# Patient Record
Sex: MALE | Race: BLACK OR AFRICAN AMERICAN
[De-identification: names, ages, dates, MRNs, and addresses within clinical notes are randomized per-mention and may not be internally consistent; named-entity substitution may affect disease eponyms.]

---

## 2017-02-21 ENCOUNTER — HOSPITAL ENCOUNTER (OUTPATIENT)
Dept: HOSPITAL 74 - FASU | Age: 62
Discharge: HOME | End: 2017-02-21
Attending: OPHTHALMOLOGY
Payer: COMMERCIAL

## 2017-02-21 VITALS — BODY MASS INDEX: 27.9 KG/M2

## 2017-02-21 VITALS — SYSTOLIC BLOOD PRESSURE: 136 MMHG | DIASTOLIC BLOOD PRESSURE: 72 MMHG | HEART RATE: 58 BPM

## 2017-02-21 VITALS — TEMPERATURE: 97.8 F

## 2017-02-21 DIAGNOSIS — H02.423: Primary | ICD-10-CM

## 2017-02-21 PROCEDURE — 08SN0ZZ REPOSITION RIGHT UPPER EYELID, OPEN APPROACH: ICD-10-PCS | Performed by: OPHTHALMOLOGY

## 2017-02-21 PROCEDURE — 08SP0ZZ REPOSITION LEFT UPPER EYELID, OPEN APPROACH: ICD-10-PCS | Performed by: OPHTHALMOLOGY

## 2017-02-21 NOTE — OP
DATE OF OPERATION:  02/21/2017

 

PREOPERATIVE DIAGNOSIS:  Ptosis with visual obstruction and dermatochalasis, both

upper lids.

 

POSTOPERATIVE DIAGNOSIS:  Ptosis with visual obstruction and dermatochalasis, both

upper lids. 

 

PROCEDURE:  

1.  Levator advancement, reattachment, right upper lid. 

2.  Levator advancement, reattachment, left upper lid. 

3.  Blepharoplasty, bilateral upper lids.  

 

SURGEON:  Kapil Hianes MD

 

ANESTHESIA:  Local with sedation.

 

COMPLICATONS:  None.

 

ESTIMATED BLOOD LOSS:  3 mL.   

 

OPERATIVE REPORT:  Patient was brought to the operating room, placed on the operating

room table.  Vital signs monitored by Anesthesia.  Tetracaine was placed in both

eyes.  Lid creases marked symmetrically approximately 9 mm essentially above the

pupil and tapering nasally and temporally and to be symmetric as checked with a

caliper, and then leaving approximately 11 to 12 mm of eyelid skin between the

inferior brow, follicles, and the superior edge of the proposed elliptical excision. 

This was marked with the caliper.  The ellipsis was drawn and then checked with a

smooth forceps to ensure that there would be only minimal lash eversion with this

kind of incorporation of skin.  A time-out was performed, and intravenous sedation

was administered, and 2% Xylocaine with 1:100,000 epinephrine was injected

subcutaneously in the crease of the lower lid and then across the eyelid in the area

of the proposed blepharoplasty for a total of 1.5 mL in both eyelids.  Massage was

applied for hemostasis.  Patient was prepped and draped in the usual sterile fashion

exposing both eyes.  

 

The following procedure was performed bilaterally.  Lid crease was incised down to

skin, subcutaneous tissue, exposing the septum.  A suborbicularis plane was dissected

superiorly to expose fat bulge.  The septum was opened centrally exposing the fat,

which was then elevated and  from the glistening levator aponeurosis muscle.

 In both eyelids, the levator aponeurosis was seen to be dehisced from the

anterior-superior tarsus with a rarification of the distal aponeurosis.  The

suborbicularis plane was then dissected inferiorly exposing the anterior-superior

tarsus, and each levator aponeurosis was now reattached to the tarsus with a central

and temporal partial-thickness scleral bite of 6-0 Vicryl suture in double-armed

fashion.  These were tied.  The patient was allowed to wake up and placed in the

upright position.  Once he was fully awake, the sutures were adjusted temporally on

the right upper lid until good lid symmetry contour and lid height were obtained. 

The lids were approximately 2 to 3 mm above the lid margin consistent with his

younger pictures.  

 

Patient was now placed in the supine position and more sedation was administered. 

The previously marked blepharoplasty incisions were now excised with a 15 blade and

then with a [place] needle, and nasally, orbicularis incision was made, and then

gentle spreading with Shultz scissors to expose the nasal fat pockets, which were

then sculpted to remove the bulk of the nasal eyelids and feathered into the central

fat pocket.  Hemostasis was achieved throughout the case, and antibiotic irrigation

was used throughout the case.  The eyelids were then closed with a running 6-0 nylon

suture from nasal to temporal, closing skin to skin.  The patient was checked one

more time to ensure good symmetry of the skin.  A small amount of skin was excised

from the left upper lid after this check, as the more conservative ellipse had been

drawn initially, and then, the actual marked line was now excised with the second

excision, and after the skin was closed with running 6-0 nylon sutures, Bacitracin

ointment was placed on the sutures, lids were everted demonstrating no penetration of

the Vicryl sutures, and the patient was taken to the recovery room in stable

condition.  

 

 

KAPIL HAINES M.D.

 

LYNDSEY/[id number]

DD: 02/21/2017 11:53

DT: 02/21/2017 13:14

Job #:  [id number]

## 2017-02-23 NOTE — PATH
Surgical Pathology Report



Patient Name:  SHAUNA HURTADO

Accession #:  

Med. Rec. #:  [id number]                                                        

   /Age/Gender:  1955 (Age: 62) / M

Account:  [id number]                                                          

             Location: [facility] 

Taken:  2017

Received:  2017

Reported:  2017

Physicians:  Mich Reilly

  



Specimen(s) Received

A: RIGTH UPPER EYELID 

B: LEFT UPPER EYELID 





Clinical History

Ptosis both upper eyelids







Final Diagnosis

A. SKIN, RIGHT UPPER EYELID, BLEPHAROPLASTY:  

BENIGN SKIN.



B. SKIN, LEFT UPPER EYELID, BLEPHAROPLASTY:

BENIGN SKIN.





***Electronically Signed***

Alexander Finkelstein, M.D.





Gross Description

A. Received in formalin, labeled "right upper eyelid" is a 5.0 x 0.9 cm

tan-brown, elliptical, unoriented fragment of skin shave. The epidermal surface

is unremarkable. The base is inked blue and representative sections are

submitted in one cassette.



B. Received in formalin, labeled "left upper eyelid" is a 4.3 x 1.0 cm

tan-brown, elliptical, unoriented fragment of skin. The epidermal surface is

unremarkable. The base is inked blue and representative sections are submitted

in one cassette.

## 2018-11-19 PROBLEM — Z00.00 ENCOUNTER FOR PREVENTIVE HEALTH EXAMINATION: Status: ACTIVE | Noted: 2018-11-19

## 2018-11-26 ENCOUNTER — APPOINTMENT (OUTPATIENT)
Dept: HEMATOLOGY ONCOLOGY | Facility: CLINIC | Age: 63
End: 2018-11-26
Payer: MEDICARE

## 2018-11-26 ENCOUNTER — RESULT REVIEW (OUTPATIENT)
Age: 63
End: 2018-11-26

## 2018-11-26 DIAGNOSIS — M75.101 UNSPECIFIED ROTATOR CUFF TEAR OR RUPTURE OF RIGHT SHOULDER, NOT SPECIFIED AS TRAUMATIC: ICD-10-CM

## 2018-11-26 DIAGNOSIS — Z83.3 FAMILY HISTORY OF DIABETES MELLITUS: ICD-10-CM

## 2018-11-26 DIAGNOSIS — Z87.438 PERSONAL HISTORY OF OTHER DISEASES OF MALE GENITAL ORGANS: ICD-10-CM

## 2018-11-26 DIAGNOSIS — K21.9 GASTRO-ESOPHAGEAL REFLUX DISEASE W/OUT ESOPHAGITIS: ICD-10-CM

## 2018-11-26 DIAGNOSIS — Z86.010 PERSONAL HISTORY OF COLONIC POLYPS: ICD-10-CM

## 2018-11-26 PROCEDURE — 99205 OFFICE O/P NEW HI 60 MIN: CPT

## 2018-11-26 RX ORDER — FEXOFENADINE HYDROCHLORIDE 180 MG/1
180 TABLET, FILM COATED ORAL
Refills: 0 | Status: ACTIVE | COMMUNITY

## 2018-11-26 RX ORDER — MULTIVITAMIN
TABLET ORAL
Refills: 0 | Status: ACTIVE | COMMUNITY

## 2018-11-26 NOTE — PHYSICAL EXAM
[Fully active, able to carry on all pre-disease performance without restriction] : Status 0 - Fully active, able to carry on all pre-disease performance without restriction [Normal] : full range of motion and no deformities appreciated

## 2018-11-27 PROBLEM — K21.9 CHRONIC GERD: Status: RESOLVED | Noted: 2018-11-26 | Resolved: 2018-11-27

## 2018-11-27 NOTE — CONSULT LETTER
[Dear  ___] : Dear  [unfilled], [Consult Letter:] : I had the pleasure of evaluating your patient, [unfilled]. [Please see my note below.] : Please see my note below. [Consult Closing:] : Thank you very much for allowing me to participate in the care of this patient.  If you have any questions, please do not hesitate to contact me. [Sincerely,] : Sincerely, [FreeTextEntry3] : Santiago Washington MD, MPH\par Attending Physician\par Hematology Oncology\par [facility] [place]\par [facility]\par

## 2018-11-27 NOTE — ASSESSMENT
[FreeTextEntry1] : Leucopenia\par Neutropenia, lymphopenia\par \par Discussed about the differential including viral syndrome vs nutritional (B12, copper def) vs ?primary bone marrow event vs autoimmune event\par Send blood work as outlined below\par Ultrasound abdomen to look for hepatosplenomegaly\par \par Follow up in 2 weeks to review findings. No repeat labs at follow up

## 2018-11-27 NOTE — HISTORY OF PRESENT ILLNESS
[de-identified] : Mr Pabon is a very pleasant 63 year old gentleman with PMHx of BPH seen today to evaluated  for leucopenia\par \par He follows with Dr Kellogg for almost 10 years and has physical and blood work at least annually\par Was never told of low WBC\par \par Has seen Dr Reynolds (Hematology, [place]) long time back - unsure why.\par Was monitored for a while and they he stopped following up\par \par No fevers chills night sweats\par No fatigue, tiredness, apathy\par No appetite loss or weight loss/weight gain\par No chest pain, shortness of breath, palpitation, dizziness\par No abdominal pain, nausea, vomiting, diarrhea, constipation\par No bright red blood per rectum, hematemesis or melena\par No hematuria, dysuria, frequency, urgency\par No headaches, visual changes, focal weakness, tingling, numbness\par \par No other OTC or herbal medications\par \par He took intra-articular steroids for right rotator cuff tear at [place] ([place])

## 2018-12-04 ENCOUNTER — RESULT REVIEW (OUTPATIENT)
Age: 63
End: 2018-12-04

## 2018-12-14 ENCOUNTER — APPOINTMENT (OUTPATIENT)
Dept: HEMATOLOGY ONCOLOGY | Facility: CLINIC | Age: 63
End: 2018-12-14
Payer: MEDICARE

## 2018-12-14 VITALS
SYSTOLIC BLOOD PRESSURE: 128 MMHG | DIASTOLIC BLOOD PRESSURE: 75 MMHG | HEART RATE: 52 BPM | TEMPERATURE: 98.2 F | WEIGHT: 238 LBS | OXYGEN SATURATION: 98 % | RESPIRATION RATE: 20 BRPM

## 2018-12-14 PROCEDURE — 99214 OFFICE O/P EST MOD 30 MIN: CPT

## 2018-12-14 NOTE — HISTORY OF PRESENT ILLNESS
[de-identified] : Mr Pabon is a very pleasant 63 year old gentleman with PMHx of BPH seen today to evaluated  for leucopenia\par \par He follows with Dr Kellogg for almost 10 years and has physical and blood work at least annually\par Was never told of low WBC\par \par Has seen Dr Reynolds (Hematology, [place]) long time back - unsure why.\par Was monitored for a while and they he stopped following up\par \par No fevers chills night sweats\par No fatigue, tiredness, apathy\par No appetite loss or weight loss/weight gain\par No chest pain, shortness of breath, palpitation, dizziness\par No abdominal pain, nausea, vomiting, diarrhea, constipation\par No bright red blood per rectum, hematemesis or melena\par No hematuria, dysuria, frequency, urgency\par No headaches, visual changes, focal weakness, tingling, numbness\par \par No other OTC or herbal medications\par \par He took intra-articular steroids for right rotator cuff tear at [place] ([place]) [de-identified] : HE is seen today for follow up\par \par No new complaints\par \par

## 2018-12-14 NOTE — ASSESSMENT
[FreeTextEntry1] : Leucopenia\par Neutropenia, lymphopenia\par \par Repeat labs show slightly improved WBC, but has elevated LDH\par Iron profile suggestive of iron overload\par Ultrasound abdomen rules out hepatosplenomegaly\par \par Plan to repeat labs including LDH, hemochromatosis gene study in 3 weeks and follow up in 4 weeks\par No labs on the day of next appointment\par

## 2018-12-14 NOTE — CONSULT LETTER
[Dear  ___] : Dear  [unfilled], [Please see my note below.] : Please see my note below. [Consult Closing:] : Thank you very much for allowing me to participate in the care of this patient.  If you have any questions, please do not hesitate to contact me. [Sincerely,] : Sincerely, [Courtesy Letter:] : I had the pleasure of seeing your patient, [unfilled], in my office today. [FreeTextEntry3] : Santiago Washington MD, MPH\par Attending Physician\par Hematology Oncology\par [facility] [place]\par [facility]\par

## 2018-12-14 NOTE — RESULTS/DATA
[FreeTextEntry1] : WBC 3.3 -> 4.4\par LDH 1267\par ferritin 351.9\par % sat 48\par \par Ultrasound\par Normal caliber spleen and grossly unremarkable appearance of the liver.\par

## 2019-01-04 ENCOUNTER — APPOINTMENT (OUTPATIENT)
Dept: HEMATOLOGY ONCOLOGY | Facility: CLINIC | Age: 64
End: 2019-01-04
Payer: MEDICARE

## 2019-01-04 ENCOUNTER — RESULT REVIEW (OUTPATIENT)
Age: 64
End: 2019-01-04

## 2019-01-04 VITALS
TEMPERATURE: 98 F | RESPIRATION RATE: 16 BRPM | DIASTOLIC BLOOD PRESSURE: 67 MMHG | HEART RATE: 57 BPM | WEIGHT: 235 LBS | SYSTOLIC BLOOD PRESSURE: 109 MMHG | OXYGEN SATURATION: 95 %

## 2019-01-04 PROCEDURE — 99499A: CUSTOM | Mod: NC

## 2019-01-11 ENCOUNTER — APPOINTMENT (OUTPATIENT)
Dept: HEMATOLOGY ONCOLOGY | Facility: CLINIC | Age: 64
End: 2019-01-11
Payer: MEDICARE

## 2019-01-11 VITALS
SYSTOLIC BLOOD PRESSURE: 132 MMHG | RESPIRATION RATE: 18 BRPM | HEART RATE: 59 BPM | WEIGHT: 241 LBS | HEIGHT: 76.38 IN | TEMPERATURE: 98.6 F | DIASTOLIC BLOOD PRESSURE: 74 MMHG | BODY MASS INDEX: 29.05 KG/M2 | OXYGEN SATURATION: 97 %

## 2019-01-11 DIAGNOSIS — R74.0 NONSPECIFIC ELEVATION OF LEVELS OF TRANSAMINASE AND LACTIC ACID DEHYDROGENASE [LDH]: ICD-10-CM

## 2019-01-11 PROCEDURE — 99214 OFFICE O/P EST MOD 30 MIN: CPT

## 2019-01-11 NOTE — RESULTS/DATA
[FreeTextEntry1] : WBC 3.3 -> 4.4 ->3.5\par LDH 1267 -> 400s\par ferritin 351.9\par % sat 48\par \par Ultrasound\par Normal caliber spleen and grossly unremarkable appearance of the liver.\par

## 2019-01-11 NOTE — HISTORY OF PRESENT ILLNESS
[de-identified] : Mr Pabon is a very pleasant 63 year old gentleman with PMHx of BPH seen today to evaluated  for leucopenia\par \par He follows with Dr Kellogg for almost 10 years and has physical and blood work at least annually\par Was never told of low WBC\par \par Has seen Dr Reynolds (Hematology, [place]) long time back - unsure why.\par Was monitored for a while and they he stopped following up\par \par No fevers chills night sweats\par No fatigue, tiredness, apathy\par No appetite loss or weight loss/weight gain\par No chest pain, shortness of breath, palpitation, dizziness\par No abdominal pain, nausea, vomiting, diarrhea, constipation\par No bright red blood per rectum, hematemesis or melena\par No hematuria, dysuria, frequency, urgency\par No headaches, visual changes, focal weakness, tingling, numbness\par \par No other OTC or herbal medications\par \par He took intra-articular steroids for right rotator cuff tear at [place] ([place]) [de-identified] : HE is seen today for follow up\par \par No new complaints\par \par

## 2019-01-11 NOTE — CONSULT LETTER
[Dear  ___] : Dear  [unfilled], [Courtesy Letter:] : I had the pleasure of seeing your patient, [unfilled], in my office today. [Please see my note below.] : Please see my note below. [Consult Closing:] : Thank you very much for allowing me to participate in the care of this patient.  If you have any questions, please do not hesitate to contact me. [Sincerely,] : Sincerely, [FreeTextEntry3] : Santiago Washington MD, MPH\par Attending Physician\par Hematology Oncology\par [facility] [place]\par [facility]\par

## 2019-01-11 NOTE — ASSESSMENT
[FreeTextEntry1] : Leucopenia\par Neutropenia, lymphopenia\par \par Clinically he feels good\par Fluctuating WBC. LDH was elevated in the initial labs, normalized on repeat\par Iron profile suggestive of iron overload - hemochromatosis gene study negative\par Ultrasound abdomen rules out hepatosplenomegaly\par At this time, recommend observation\par \par Plan to repeat labs CBC, CMP, LDH, AN A in 2.5 months and follow up in 3 months\par No labs on the day of next appointment\par

## 2019-04-18 ENCOUNTER — RESULT REVIEW (OUTPATIENT)
Age: 64
End: 2019-04-18

## 2019-04-18 ENCOUNTER — APPOINTMENT (OUTPATIENT)
Dept: HEMATOLOGY ONCOLOGY | Facility: CLINIC | Age: 64
End: 2019-04-18
Payer: MEDICARE

## 2019-04-18 VITALS
OXYGEN SATURATION: 95 % | HEIGHT: 76.38 IN | DIASTOLIC BLOOD PRESSURE: 73 MMHG | RESPIRATION RATE: 16 BRPM | SYSTOLIC BLOOD PRESSURE: 126 MMHG | TEMPERATURE: 98.2 F | BODY MASS INDEX: 28.8 KG/M2 | HEART RATE: 61 BPM | WEIGHT: 239 LBS

## 2019-04-18 PROCEDURE — 99499A: CUSTOM | Mod: NC

## 2019-04-26 ENCOUNTER — APPOINTMENT (OUTPATIENT)
Dept: HEMATOLOGY ONCOLOGY | Facility: CLINIC | Age: 64
End: 2019-04-26
Payer: MEDICARE

## 2019-04-26 VITALS
BODY MASS INDEX: 28.8 KG/M2 | DIASTOLIC BLOOD PRESSURE: 75 MMHG | RESPIRATION RATE: 20 BRPM | HEART RATE: 56 BPM | OXYGEN SATURATION: 96 % | WEIGHT: 239 LBS | SYSTOLIC BLOOD PRESSURE: 122 MMHG | TEMPERATURE: 99 F | HEIGHT: 76.38 IN

## 2019-04-26 PROCEDURE — 99214 OFFICE O/P EST MOD 30 MIN: CPT

## 2019-05-08 ENCOUNTER — APPOINTMENT (OUTPATIENT)
Dept: UROLOGY | Facility: CLINIC | Age: 64
End: 2019-05-08
Payer: MEDICARE

## 2019-05-08 VITALS
SYSTOLIC BLOOD PRESSURE: 105 MMHG | BODY MASS INDEX: 28.44 KG/M2 | WEIGHT: 236 LBS | DIASTOLIC BLOOD PRESSURE: 57 MMHG | TEMPERATURE: 63 F | HEIGHT: 76.38 IN

## 2019-05-08 PROCEDURE — 99213 OFFICE O/P EST LOW 20 MIN: CPT

## 2019-05-08 NOTE — PHYSICAL EXAM
[Urinary Bladder Findings] : the bladder was normal on palpation [Urethral Meatus] : meatus normal [Scrotum] : the scrotum was normal [Testes Mass (___cm)] : there were no testicular masses [No Prostate Nodules] : no prostate nodules

## 2019-05-30 ENCOUNTER — APPOINTMENT (OUTPATIENT)
Dept: RHEUMATOLOGY | Facility: CLINIC | Age: 64
End: 2019-05-30
Payer: MEDICARE

## 2019-05-30 VITALS
BODY MASS INDEX: 29.1 KG/M2 | OXYGEN SATURATION: 98 % | HEIGHT: 76 IN | DIASTOLIC BLOOD PRESSURE: 76 MMHG | HEART RATE: 88 BPM | WEIGHT: 239 LBS | SYSTOLIC BLOOD PRESSURE: 108 MMHG

## 2019-05-30 PROCEDURE — 99205 OFFICE O/P NEW HI 60 MIN: CPT

## 2019-05-31 LAB
CRP SERPL-MCNC: 0.34 MG/DL
ENA RNP AB SER IA-ACNC: 1 AL
ENA SCL70 IGG SER IA-ACNC: <0.2 AL
ENA SM AB SER IA-ACNC: <0.2 AL
ENA SS-A AB SER IA-ACNC: <0.2 AL
ENA SS-B AB SER IA-ACNC: <0.2 AL
ERYTHROCYTE [SEDIMENTATION RATE] IN BLOOD BY WESTERGREN METHOD: 19 MM/HR
RHEUMATOID FACT SER QL: <10 IU/ML

## 2019-05-31 NOTE — REVIEW OF SYSTEMS
[Fever] : no fever [Chills] : no chills [Eye Pain] : no eye pain [Red Eyes] : eyes not red [Shortness Of Breath] : no shortness of breath [Cough] : no cough [Abdominal Pain] : no abdominal pain [Diarrhea] : no diarrhea [Dysuria] : no dysuria [Joint Pain] : no joint pain [Joint Swelling] : no joint swelling [Joint Stiffness] : no joint stiffness [Skin Lesions] : no skin lesions [Convulsions] : no convulsions [FreeTextEntry6] : No pleuritic C/P

## 2019-05-31 NOTE — HISTORY OF PRESENT ILLNESS
[FreeTextEntry1] : Patient was found to have leukopenia during routine PE and was sent to hematology, and when it was noted that work up revealed JÚNIOR positivity, was sent here for evaluation. There have been no fever, rash or skin photosensitivity, Raynaud's, muscle weakness, joint symptoms or symptoms of serositis. 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

## 2019-05-31 NOTE — PHYSICAL EXAM
[General Appearance - Alert] : alert [General Appearance - In No Acute Distress] : in no acute distress [General Appearance - Well Nourished] : well nourished [General Appearance - Well Developed] : well developed [Sclera] : the sclera and conjunctiva were normal [Auscultation Breath Sounds / Voice Sounds] : lungs were clear to auscultation bilaterally [Cervical Lymph Nodes Enlarged Posterior Bilaterally] : posterior cervical [Cervical Lymph Nodes Enlarged Anterior Bilaterally] : anterior cervical [] : no rash [Motor Exam] : the motor exam was normal [FreeTextEntry1] : No evidence of active synovitis throughout all joints examined

## 2019-06-01 LAB — DSDNA AB SER-ACNC: <12 IU/ML

## 2019-06-04 LAB
CCP AB SER IA-ACNC: <8 UNITS
RF+CCP IGG SER-IMP: NEGATIVE

## 2019-07-23 ENCOUNTER — RX RENEWAL (OUTPATIENT)
Age: 64
End: 2019-07-23

## 2019-07-24 ENCOUNTER — RX CHANGE (OUTPATIENT)
Age: 64
End: 2019-07-24

## 2019-08-06 ENCOUNTER — APPOINTMENT (OUTPATIENT)
Dept: UROLOGY | Facility: CLINIC | Age: 64
End: 2019-08-06
Payer: MEDICARE

## 2019-08-06 VITALS
DIASTOLIC BLOOD PRESSURE: 78 MMHG | WEIGHT: 236 LBS | HEART RATE: 60 BPM | HEIGHT: 76 IN | BODY MASS INDEX: 28.74 KG/M2 | SYSTOLIC BLOOD PRESSURE: 131 MMHG

## 2019-08-06 PROCEDURE — 51798 US URINE CAPACITY MEASURE: CPT

## 2019-08-06 PROCEDURE — 99213 OFFICE O/P EST LOW 20 MIN: CPT | Mod: 25

## 2019-08-06 NOTE — PHYSICAL EXAM
[General Appearance - Well Developed] : well developed [General Appearance - Well Nourished] : well nourished [Normal Appearance] : normal appearance [Well Groomed] : well groomed [General Appearance - In No Acute Distress] : no acute distress [Abdomen Soft] : soft [Costovertebral Angle Tenderness] : no ~M costovertebral angle tenderness [Abdomen Tenderness] : non-tender [Urethral Meatus] : meatus normal [Urinary Bladder Findings] : the bladder was normal on palpation [Testes Mass (___cm)] : there were no testicular masses [Scrotum] : the scrotum was normal [No Prostate Nodules] : no prostate nodules [Edema] : no peripheral edema [] : no respiratory distress [Respiration, Rhythm And Depth] : normal respiratory rhythm and effort [Oriented To Time, Place, And Person] : oriented to person, place, and time [Exaggerated Use Of Accessory Muscles For Inspiration] : no accessory muscle use [Affect] : the affect was normal [Mood] : the mood was normal [Not Anxious] : not anxious [Normal Station and Gait] : the gait and station were normal for the patient's age [No Focal Deficits] : no focal deficits [No Palpable Adenopathy] : no palpable adenopathy [FreeTextEntry1] : The prostate is smooth symmetrical and non-concerning on digital rectal examination

## 2019-08-06 NOTE — ASSESSMENT
[FreeTextEntry1] : This is a followup visit for this 64 showed patient who has BPH and was on Cialis 5 mg and wishes he could do better\par He is up and 0-2 times a night and has a slow urination but an ultrasound done today shows that he empties his bladder\par He was unable to tolerate alpha blocker and a discussed with him the option of a Rezum procedure\par He prefers to stay on Cialis 5 mg\par At his request assessment a prescription to [facility] for Cialis 20 mg for his erectile dysfunction
Abdomen soft, non-tender, no guarding.

## 2019-08-06 NOTE — ADDENDUM
[FreeTextEntry1] : PVR\par A transabdominal ultrasound was performed collecting of the bladder and transverse and longitudinal at the postvoid residual

## 2019-09-27 NOTE — RESULTS/DATA
[FreeTextEntry1] : WBC 3.3 -> 4.4 ->3.5 -> 3.8\par LDH 1267 -> 400s-> normal\par ferritin 351.9\par % sat 48\par \par Ultrasound\par Normal caliber spleen and grossly unremarkable appearance of the liver.\par

## 2019-09-27 NOTE — ASSESSMENT
[FreeTextEntry1] : Leucopenia\par Neutropenia, lymphopenia\par \par Clinically he feels good\par Fluctuating WBC. LDH was elevated in the initial labs, normalized on repeat\par Iron profile suggestive of iron overload - hemochromatosis gene study negative\par Ultrasound abdomen rules out hepatosplenomegaly\par At this time, recommend observation\par \par Positive JÚNIOR- low titers, Refer to rheumatology\par \par Plan to repeat labs CBC, CMP, LDH, JÚNIOR and follow up in 6 months\par No labs on the day of next appointment (if he had labs prior to the appointment)\par

## 2019-09-27 NOTE — CONSULT LETTER
[Dear  ___] : Dear  [unfilled], [Courtesy Letter:] : I had the pleasure of seeing your patient, [unfilled], in my office today. [Please see my note below.] : Please see my note below. [Consult Closing:] : Thank you very much for allowing me to participate in the care of this patient.  If you have any questions, please do not hesitate to contact me. [Sincerely,] : Sincerely, [FreeTextEntry3] : Santiago Washington MD, MPH\par Attending Physician\par Hematology Oncology\par [facility] [place]\par [facility]\par

## 2019-09-27 NOTE — HISTORY OF PRESENT ILLNESS
[de-identified] : Mr Pabon is a very pleasant 63 year old gentleman with PMHx of BPH seen today to evaluated  for leucopenia\par \par He follows with Dr Kellogg for almost 10 years and has physical and blood work at least annually\par Was never told of low WBC\par \par Has seen Dr Reynolds (Hematology, [place]) long time back - unsure why.\par Was monitored for a while and they he stopped following up\par \par No fevers chills night sweats\par No fatigue, tiredness, apathy\par No appetite loss or weight loss/weight gain\par No chest pain, shortness of breath, palpitation, dizziness\par No abdominal pain, nausea, vomiting, diarrhea, constipation\par No bright red blood per rectum, hematemesis or melena\par No hematuria, dysuria, frequency, urgency\par No headaches, visual changes, focal weakness, tingling, numbness\par \par No other OTC or herbal medications\par \par He took intra-articular steroids for right rotator cuff tear at [place] ([place]) [de-identified] : HE is seen today for follow up\par \par No new complaints\par \par

## 2019-10-04 ENCOUNTER — RESULT REVIEW (OUTPATIENT)
Age: 64
End: 2019-10-04

## 2019-10-04 ENCOUNTER — APPOINTMENT (OUTPATIENT)
Dept: HEMATOLOGY ONCOLOGY | Facility: CLINIC | Age: 64
End: 2019-10-04
Payer: MEDICARE

## 2019-10-04 VITALS
HEIGHT: 75.98 IN | SYSTOLIC BLOOD PRESSURE: 106 MMHG | OXYGEN SATURATION: 96 % | BODY MASS INDEX: 29.22 KG/M2 | RESPIRATION RATE: 16 BRPM | WEIGHT: 240 LBS | DIASTOLIC BLOOD PRESSURE: 62 MMHG | HEART RATE: 49 BPM | TEMPERATURE: 98.6 F

## 2019-10-04 PROCEDURE — 99499A: CUSTOM | Mod: NC

## 2019-10-04 NOTE — CONSULT LETTER
[Dear  ___] : Dear  [unfilled], [Please see my note below.] : Please see my note below. [Courtesy Letter:] : I had the pleasure of seeing your patient, [unfilled], in my office today. [Sincerely,] : Sincerely, [Consult Closing:] : Thank you very much for allowing me to participate in the care of this patient.  If you have any questions, please do not hesitate to contact me. [FreeTextEntry3] : Santiago Washington MD, MPH\par Attending Physician\par Hematology Oncology\par [facility] [place]\par [facility]\par

## 2019-10-04 NOTE — HISTORY OF PRESENT ILLNESS
[de-identified] : Mr Pabon is a very pleasant 63 year old gentleman with PMHx of BPH seen today to evaluated  for leucopenia\par \par He follows with Dr Kellogg for almost 10 years and has physical and blood work at least annually\par Was never told of low WBC\par \par Has seen Dr Reynolds (Hematology, [place]) long time back - unsure why.\par Was monitored for a while and they he stopped following up\par \par No fevers chills night sweats\par No fatigue, tiredness, apathy\par No appetite loss or weight loss/weight gain\par No chest pain, shortness of breath, palpitation, dizziness\par No abdominal pain, nausea, vomiting, diarrhea, constipation\par No bright red blood per rectum, hematemesis or melena\par No hematuria, dysuria, frequency, urgency\par No headaches, visual changes, focal weakness, tingling, numbness\par \par No other OTC or herbal medications\par \par He took intra-articular steroids for right rotator cuff tear at [place] ([place]) [de-identified] : HE is seen today for follow up\par \par No new complaints\par \par

## 2019-10-04 NOTE — HISTORY OF PRESENT ILLNESS
[de-identified] : Mr Pabon is a very pleasant 63 year old gentleman with PMHx of BPH seen today to evaluated  for leucopenia\par \par He follows with Dr Kellogg for almost 10 years and has physical and blood work at least annually\par Was never told of low WBC\par \par Has seen Dr Reynolds (Hematology, [place]) long time back - unsure why.\par Was monitored for a while and they he stopped following up\par \par No fevers chills night sweats\par No fatigue, tiredness, apathy\par No appetite loss or weight loss/weight gain\par No chest pain, shortness of breath, palpitation, dizziness\par No abdominal pain, nausea, vomiting, diarrhea, constipation\par No bright red blood per rectum, hematemesis or melena\par No hematuria, dysuria, frequency, urgency\par No headaches, visual changes, focal weakness, tingling, numbness\par \par No other OTC or herbal medications\par \par He took intra-articular steroids for right rotator cuff tear at [place] ([place]) [de-identified] : HE is seen today for follow up\par \par No new complaints\par \par

## 2019-10-04 NOTE — HISTORY OF PRESENT ILLNESS
[de-identified] : Mr Pabon is a very pleasant 63 year old gentleman with PMHx of BPH seen today to evaluated  for leucopenia\par \par He follows with Dr Kellogg for almost 10 years and has physical and blood work at least annually\par Was never told of low WBC\par \par Has seen Dr Reynolds (Hematology, [place]) long time back - unsure why.\par Was monitored for a while and they he stopped following up\par \par No fevers chills night sweats\par No fatigue, tiredness, apathy\par No appetite loss or weight loss/weight gain\par No chest pain, shortness of breath, palpitation, dizziness\par No abdominal pain, nausea, vomiting, diarrhea, constipation\par No bright red blood per rectum, hematemesis or melena\par No hematuria, dysuria, frequency, urgency\par No headaches, visual changes, focal weakness, tingling, numbness\par \par No other OTC or herbal medications\par \par He took intra-articular steroids for right rotator cuff tear at [place] ([place]) [de-identified] : HE is seen today for follow up\par \par No new complaints\par \par

## 2019-10-04 NOTE — CONSULT LETTER
[Dear  ___] : Dear  [unfilled], [Courtesy Letter:] : I had the pleasure of seeing your patient, [unfilled], in my office today. [Please see my note below.] : Please see my note below. [Consult Closing:] : Thank you very much for allowing me to participate in the care of this patient.  If you have any questions, please do not hesitate to contact me. [Sincerely,] : Sincerely, [FreeTextEntry3] : Santiago Washington MD, MPH\par Attending Physician\par Hematology Oncology\par [facility] [place]\par [facility]\par

## 2019-10-04 NOTE — CONSULT LETTER
[Dear  ___] : Dear  [unfilled], [Please see my note below.] : Please see my note below. [Courtesy Letter:] : I had the pleasure of seeing your patient, [unfilled], in my office today. [Consult Closing:] : Thank you very much for allowing me to participate in the care of this patient.  If you have any questions, please do not hesitate to contact me. [Sincerely,] : Sincerely, [FreeTextEntry3] : Santiago Washington MD, MPH\par Attending Physician\par Hematology Oncology\par [facility] [place]\par [facility]\par

## 2019-10-11 ENCOUNTER — APPOINTMENT (OUTPATIENT)
Dept: HEMATOLOGY ONCOLOGY | Facility: CLINIC | Age: 64
End: 2019-10-11
Payer: MEDICARE

## 2019-10-11 VITALS
OXYGEN SATURATION: 97 % | HEART RATE: 60 BPM | TEMPERATURE: 98.6 F | DIASTOLIC BLOOD PRESSURE: 66 MMHG | HEIGHT: 75.98 IN | SYSTOLIC BLOOD PRESSURE: 107 MMHG | BODY MASS INDEX: 29.22 KG/M2 | WEIGHT: 240 LBS | RESPIRATION RATE: 18 BRPM

## 2019-10-11 DIAGNOSIS — R76.8 OTHER SPECIFIED ABNORMAL IMMUNOLOGICAL FINDINGS IN SERUM: ICD-10-CM

## 2019-10-11 PROCEDURE — 99214 OFFICE O/P EST MOD 30 MIN: CPT

## 2019-10-11 NOTE — ASSESSMENT
[FreeTextEntry1] : Leucopenia\par Mostly lymphopenia, occasional neutropenia\par JÚNIOR positive - low titers\par \par Clinically he feels good\par Fluctuating WBC\par LDH was elevated in the initial labs, normalized on repeat\par Ultrasound abdomen rules out hepatosplenomegaly\par At this time, recommend observation\par If WBC continues to trend down, for increase incidence of infections- will consider bone marrow\par \par Iron profile suggestive of iron overload - hemochromatosis gene study negative\par \par Positive JÚNIOR- low titers\par Follows with Dr Shaw\par \par Follow up in 3 months. CBC, CMP, iron, TIBC, ferritin, LDH, GGT

## 2019-10-11 NOTE — HISTORY OF PRESENT ILLNESS
[de-identified] : Mr Pabon is a very pleasant 63 year old gentleman with PMHx of BPH seen today to evaluated  for leucopenia\par \par He follows with Dr Kellogg for almost 10 years and has physical and blood work at least annually\par Was never told of low WBC\par \par Has seen Dr Reynolds (Hematology, [place]) long time back - unsure why.\par Was monitored for a while and they he stopped following up\par \par No fevers chills night sweats\par No fatigue, tiredness, apathy\par No appetite loss or weight loss/weight gain\par No chest pain, shortness of breath, palpitation, dizziness\par No abdominal pain, nausea, vomiting, diarrhea, constipation\par No bright red blood per rectum, hematemesis or melena\par No hematuria, dysuria, frequency, urgency\par No headaches, visual changes, focal weakness, tingling, numbness\par \par No other OTC or herbal medications\par \par He took intra-articular steroids for right rotator cuff tear at [place] ([place])\par \par WBC 3.3 -> 4.4 ->3.5 -> 3.8\par LDH 1267 -> 400s-> normal\par ferritin 351.9\par % sat 48\par \par Ultrasound\par Normal caliber spleen and grossly unremarkable appearance of the liver.\par  [de-identified] : HE is seen today for follow up\par \par No episodes of infections requiring ABx since past visit\par He saw Dr Shaw (rheumatology)-> "leucopenia might be realted to JÚNIOR+, but no active CTD. Also had borderline RNP+\par

## 2019-10-11 NOTE — CONSULT LETTER
[Dear  ___] : Dear  [unfilled], [Courtesy Letter:] : I had the pleasure of seeing your patient, [unfilled], in my office today. [Please see my note below.] : Please see my note below. [Consult Closing:] : Thank you very much for allowing me to participate in the care of this patient.  If you have any questions, please do not hesitate to contact me. [Sincerely,] : Sincerely, [FreeTextEntry3] : Santiago Washington MD, MPH\par Attending Physician\par Hematology Oncology\par [facility] [place]\par [facility]\par

## 2019-11-06 ENCOUNTER — APPOINTMENT (OUTPATIENT)
Dept: UROLOGY | Facility: CLINIC | Age: 64
End: 2019-11-06
Payer: MEDICARE

## 2019-11-06 VITALS
HEART RATE: 77 BPM | DIASTOLIC BLOOD PRESSURE: 77 MMHG | BODY MASS INDEX: 29.22 KG/M2 | SYSTOLIC BLOOD PRESSURE: 112 MMHG | WEIGHT: 240 LBS | HEIGHT: 75.98 IN

## 2019-11-06 PROCEDURE — 99213 OFFICE O/P EST LOW 20 MIN: CPT

## 2019-11-06 RX ORDER — TADALAFIL 20 MG/1
20 TABLET ORAL
Qty: 6 | Refills: 12 | Status: DISCONTINUED | COMMUNITY
Start: 2019-07-23 | End: 2019-11-06

## 2019-11-06 RX ORDER — TADALAFIL 20 MG/1
20 TABLET ORAL
Qty: 6 | Refills: 3 | Status: DISCONTINUED | COMMUNITY
Start: 2019-05-08 | End: 2019-11-06

## 2019-11-06 NOTE — ASSESSMENT
[FreeTextEntry1] : This is a routine follow up for this 64 patient is BPH with symptoms and has been intolerant of plicating\par Based on that he was given Cialis and is doing well enough to wishes he could do better\par He has nocturia x2\par I discussed with him the possibility of the procedure but he graciously declined as he's been too bothered by his condition\par Repeat his PSA which was 2.4 one year ago

## 2019-11-06 NOTE — PHYSICAL EXAM
[General Appearance - Well Developed] : well developed [General Appearance - Well Nourished] : well nourished [Normal Appearance] : normal appearance [Well Groomed] : well groomed [General Appearance - In No Acute Distress] : no acute distress [Abdomen Soft] : soft [Abdomen Tenderness] : non-tender [Costovertebral Angle Tenderness] : no ~M costovertebral angle tenderness [Urethral Meatus] : meatus normal [Urinary Bladder Findings] : the bladder was normal on palpation [Scrotum] : the scrotum was normal [Testes Mass (___cm)] : there were no testicular masses [No Prostate Nodules] : no prostate nodules [FreeTextEntry1] : The prostate shows  modest enlargement but it is smooth and non-concerning [Edema] : no peripheral edema [] : no respiratory distress [Respiration, Rhythm And Depth] : normal respiratory rhythm and effort [Exaggerated Use Of Accessory Muscles For Inspiration] : no accessory muscle use [Oriented To Time, Place, And Person] : oriented to person, place, and time [Affect] : the affect was normal [Mood] : the mood was normal [Not Anxious] : not anxious [Normal Station and Gait] : the gait and station were normal for the patient's age [No Focal Deficits] : no focal deficits [No Palpable Adenopathy] : no palpable adenopathy

## 2019-11-18 ENCOUNTER — APPOINTMENT (OUTPATIENT)
Dept: RHEUMATOLOGY | Facility: CLINIC | Age: 64
End: 2019-11-18
Payer: MEDICARE

## 2019-11-18 VITALS
BODY MASS INDEX: 28.62 KG/M2 | WEIGHT: 235 LBS | DIASTOLIC BLOOD PRESSURE: 64 MMHG | OXYGEN SATURATION: 97 % | HEIGHT: 76 IN | SYSTOLIC BLOOD PRESSURE: 108 MMHG | HEART RATE: 66 BPM

## 2019-11-18 DIAGNOSIS — R20.2 PARESTHESIA OF SKIN: ICD-10-CM

## 2019-11-18 PROCEDURE — 99214 OFFICE O/P EST MOD 30 MIN: CPT

## 2019-11-18 NOTE — HISTORY OF PRESENT ILLNESS
[FreeTextEntry1] : Patient reports no progression of symptoms related to JÚNIOR positivity. No C/P, fever, rash, joint symptom or other symptoms as discussed. Has some paraesthesias of the posterior aspect of the left shoulder, and patient was referred to a neurologist (likely for NCV testing). Will be seeing hematology again in January for continued leukopenia.

## 2019-11-18 NOTE — PHYSICAL EXAM
[General Appearance - Alert] : alert [General Appearance - In No Acute Distress] : in no acute distress [General Appearance - Well Developed] : well developed [Sclera] : the sclera and conjunctiva were normal [General Appearance - Well Nourished] : well nourished [Auscultation Breath Sounds / Voice Sounds] : lungs were clear to auscultation bilaterally [Cervical Lymph Nodes Enlarged Posterior Bilaterally] : posterior cervical [Cervical Lymph Nodes Enlarged Anterior Bilaterally] : anterior cervical [] : no rash [FreeTextEntry1] : No evidence of active synovitis throughout all joints examined

## 2019-11-19 LAB
ENA RNP AB SER IA-ACNC: 1 AL
ENA SM AB SER IA-ACNC: <0.2 AL

## 2020-01-17 ENCOUNTER — APPOINTMENT (OUTPATIENT)
Dept: HEMATOLOGY ONCOLOGY | Facility: CLINIC | Age: 65
End: 2020-01-17
Payer: MEDICARE

## 2020-01-24 ENCOUNTER — RESULT REVIEW (OUTPATIENT)
Age: 65
End: 2020-01-24

## 2020-01-24 ENCOUNTER — APPOINTMENT (OUTPATIENT)
Dept: HEMATOLOGY ONCOLOGY | Facility: CLINIC | Age: 65
End: 2020-01-24
Payer: MEDICARE

## 2020-01-24 VITALS
HEART RATE: 63 BPM | WEIGHT: 237 LBS | DIASTOLIC BLOOD PRESSURE: 63 MMHG | TEMPERATURE: 97.6 F | BODY MASS INDEX: 28.85 KG/M2 | RESPIRATION RATE: 20 BRPM | OXYGEN SATURATION: 98 % | SYSTOLIC BLOOD PRESSURE: 127 MMHG

## 2020-01-24 PROCEDURE — 99214 OFFICE O/P EST MOD 30 MIN: CPT

## 2020-01-24 NOTE — ASSESSMENT
[FreeTextEntry1] : Leucopenia\par Mostly lymphopenia, occasional neutropenia\par JÚNIOR positive - low titers\par \par Clinically he feels good\par Fluctuating WBC\par LDH was elevated in the initial labs, normalized on repeat\par Ultrasound abdomen rules out hepatosplenomegaly\par The next step in the work up is a bone marrow - however may not be high yield given the chronicity and asymptomatic nature of his findings\par Discussed at length about the procedure. Discussed option of pursuing bone marrow vs observation\par He opts for observation\par \par Iron profile suggestive of iron overload - hemochromatosis gene study negative\par \par Positive JÚNIOR- low titers\par \par Follow up in 6 months. CBC, CMP, iron, TIBC, ferritin, LDH, GGT

## 2020-01-24 NOTE — CONSULT LETTER
[FreeTextEntry3] : Santiago Washington MD, MPH\par Attending Physician\par Hematology Oncology\par [facility] [place]\par [facility]\par

## 2020-01-24 NOTE — HISTORY OF PRESENT ILLNESS
[de-identified] : HE is seen today for follow up\par \par Overall feels well\par Has not had any infections requiring Abx\par \par He saw Dr Shaw (rheumatology)-> "leucopenia might be related to JÚNIOR+, but no active CTD. Also had borderline RNP+\par  [de-identified] : Mr Pabon is a very pleasant 63 year old gentleman with PMHx of BPH seen today to evaluated  for leucopenia\par \par He follows with Dr Kellogg for almost 10 years and has physical and blood work at least annually\par Was never told of low WBC\par \par Has seen Dr Reynolds (Hematology, [place]) long time back - unsure why.\par Was monitored for a while and they he stopped following up\par \par No fevers chills night sweats\par No fatigue, tiredness, apathy\par No appetite loss or weight loss/weight gain\par No chest pain, shortness of breath, palpitation, dizziness\par No abdominal pain, nausea, vomiting, diarrhea, constipation\par No bright red blood per rectum, hematemesis or melena\par No hematuria, dysuria, frequency, urgency\par No headaches, visual changes, focal weakness, tingling, numbness\par \par No other OTC or herbal medications\par \par He took intra-articular steroids for right rotator cuff tear at [place] ([place])\par \par WBC 3.3 -> 4.4 ->3.5 -> 3.8\par LDH 1267 -> 400s-> normal\par ferritin 351.9\par % sat 48\par \par Ultrasound\par Normal caliber spleen and grossly unremarkable appearance of the liver.\par

## 2020-02-07 ENCOUNTER — RX CHANGE (OUTPATIENT)
Age: 65
End: 2020-02-07

## 2020-05-18 ENCOUNTER — APPOINTMENT (OUTPATIENT)
Dept: RHEUMATOLOGY | Facility: CLINIC | Age: 65
End: 2020-05-18
Payer: MEDICARE

## 2020-05-18 ENCOUNTER — TRANSCRIPTION ENCOUNTER (OUTPATIENT)
Age: 65
End: 2020-05-18

## 2020-05-18 VITALS
DIASTOLIC BLOOD PRESSURE: 70 MMHG | WEIGHT: 237 LBS | SYSTOLIC BLOOD PRESSURE: 130 MMHG | BODY MASS INDEX: 28.86 KG/M2 | HEIGHT: 76 IN

## 2020-05-18 DIAGNOSIS — R76.8 OTHER SPECIFIED ABNORMAL IMMUNOLOGICAL FINDINGS IN SERUM: ICD-10-CM

## 2020-05-18 PROCEDURE — 99213 OFFICE O/P EST LOW 20 MIN: CPT

## 2020-05-18 NOTE — REVIEW OF SYSTEMS
[Chills] : no chills [Fever] : no fever [Eye Pain] : no eye pain [Shortness Of Breath] : no shortness of breath [Red Eyes] : eyes not red [Abdominal Pain] : no abdominal pain [Cough] : no cough [Joint Pain] : no joint pain [Diarrhea] : no diarrhea [Dysuria] : no dysuria [Skin Lesions] : no skin lesions [Joint Swelling] : no joint swelling [Joint Stiffness] : no joint stiffness [Convulsions] : no convulsions [FreeTextEntry6] : No pleuritic C/P

## 2020-05-18 NOTE — PHYSICAL EXAM
[General Appearance - Alert] : alert [General Appearance - In No Acute Distress] : in no acute distress [General Appearance - Well Nourished] : well nourished [General Appearance - Well Developed] : well developed [Sclera] : the sclera and conjunctiva were normal [Auscultation Breath Sounds / Voice Sounds] : lungs were clear to auscultation bilaterally [] : no rash [FreeTextEntry1] : No evidence of active synovitis throughout all joints examined

## 2020-06-10 ENCOUNTER — APPOINTMENT (OUTPATIENT)
Dept: UROLOGY | Facility: CLINIC | Age: 65
End: 2020-06-10
Payer: MEDICARE

## 2020-06-10 VITALS
WEIGHT: 230 LBS | SYSTOLIC BLOOD PRESSURE: 120 MMHG | BODY MASS INDEX: 28.01 KG/M2 | TEMPERATURE: 97.6 F | DIASTOLIC BLOOD PRESSURE: 61 MMHG | HEART RATE: 74 BPM | HEIGHT: 76 IN

## 2020-06-10 DIAGNOSIS — U07.1 COVID-19: ICD-10-CM

## 2020-06-10 PROCEDURE — 36415 COLL VENOUS BLD VENIPUNCTURE: CPT | Mod: CS

## 2020-06-10 PROCEDURE — 99213 OFFICE O/P EST LOW 20 MIN: CPT | Mod: CS

## 2020-06-10 RX ORDER — TADALAFIL 5 MG/1
5 TABLET ORAL
Qty: 90 | Refills: 3 | Status: DISCONTINUED | COMMUNITY
Start: 2019-05-08 | End: 2020-06-10

## 2020-06-10 NOTE — PHYSICAL EXAM
[General Appearance - Well Developed] : well developed [General Appearance - Well Nourished] : well nourished [Normal Appearance] : normal appearance [Well Groomed] : well groomed [General Appearance - In No Acute Distress] : no acute distress [Abdomen Soft] : soft [Abdomen Tenderness] : non-tender [Costovertebral Angle Tenderness] : no ~M costovertebral angle tenderness [Urethral Meatus] : meatus normal [Urinary Bladder Findings] : the bladder was normal on palpation [Scrotum] : the scrotum was normal [Testes Mass (___cm)] : there were no testicular masses [No Prostate Nodules] : no prostate nodules [FreeTextEntry1] : The prostate is symmetrical smooth and non-concerning a digital rectal exam [Edema] : no peripheral edema [] : no respiratory distress [Respiration, Rhythm And Depth] : normal respiratory rhythm and effort [Exaggerated Use Of Accessory Muscles For Inspiration] : no accessory muscle use [Oriented To Time, Place, And Person] : oriented to person, place, and time [Affect] : the affect was normal [Mood] : the mood was normal [Not Anxious] : not anxious [Normal Station and Gait] : the gait and station were normal for the patient's age [No Focal Deficits] : no focal deficits [No Palpable Adenopathy] : no palpable adenopathy

## 2020-06-10 NOTE — ASSESSMENT
[FreeTextEntry1] : This is a routine followup this 65-year-old patient with symptoms of bladder outlet obstruction and was given Cialis with a very satisfactory response\par Lung he states his stream is not as strong as he like activity he has no complaint his voided urine is clear his PSAs in the mid 2 range\par He is here today in routine followup having been tested for the corona virus and was found to have  antibodies\par His brother who was sick at the same time   due of the corona virus\par The patient only minimal symptomatology but will check his kidney function

## 2020-06-12 LAB
ALBUMIN SERPL ELPH-MCNC: 4.3 G/DL
ALP BLD-CCNC: 70 U/L
ALT SERPL-CCNC: 20 U/L
ANION GAP SERPL CALC-SCNC: 14 MMOL/L
AST SERPL-CCNC: 20 U/L
BILIRUB SERPL-MCNC: 0.6 MG/DL
BUN SERPL-MCNC: 13 MG/DL
CALCIUM SERPL-MCNC: 9.5 MG/DL
CHLORIDE SERPL-SCNC: 103 MMOL/L
CO2 SERPL-SCNC: 23 MMOL/L
CREAT SERPL-MCNC: 0.82 MG/DL
GLUCOSE SERPL-MCNC: 128 MG/DL
POTASSIUM SERPL-SCNC: 4.3 MMOL/L
PROT SERPL-MCNC: 6.6 G/DL
PSA SERPL-MCNC: 2.46 NG/ML
SODIUM SERPL-SCNC: 140 MMOL/L

## 2020-07-24 ENCOUNTER — RESULT REVIEW (OUTPATIENT)
Age: 65
End: 2020-07-24

## 2020-07-24 ENCOUNTER — APPOINTMENT (OUTPATIENT)
Dept: HEMATOLOGY ONCOLOGY | Facility: CLINIC | Age: 65
End: 2020-07-24
Payer: MEDICARE

## 2020-07-24 VITALS
OXYGEN SATURATION: 99 % | HEART RATE: 54 BPM | HEIGHT: 75.98 IN | SYSTOLIC BLOOD PRESSURE: 114 MMHG | BODY MASS INDEX: 28.62 KG/M2 | TEMPERATURE: 97.1 F | DIASTOLIC BLOOD PRESSURE: 67 MMHG | WEIGHT: 235 LBS | RESPIRATION RATE: 20 BRPM

## 2020-07-24 PROCEDURE — 99213 OFFICE O/P EST LOW 20 MIN: CPT

## 2020-07-27 NOTE — CONSULT LETTER
[Dear  ___] : Dear  [unfilled], [Courtesy Letter:] : I had the pleasure of seeing your patient, [unfilled], in my office today. [Please see my note below.] : Please see my note below. [Consult Closing:] : Thank you very much for allowing me to participate in the care of this patient.  If you have any questions, please do not hesitate to contact me. [Sincerely,] : Sincerely, [FreeTextEntry3] : Santiago Washington MD, MPH\par Attending Physician\par Hematology Oncology\par [facility] [place]\par [facility]\par

## 2020-07-27 NOTE — ASSESSMENT
[FreeTextEntry1] : Leucopenia\par Mostly lymphopenia, occasional neutropenia\par JÚNIOR positive - low titers\par \par Clinically he feels good\par Fluctuating WBC\par LDH was elevated in the initial labs, normalized on repeat\par Ultrasound abdomen rules out hepatosplenomegaly\par The next step in the work up is a bone marrow - however may not be high yield given the chronicity and asymptomatic nature of his findings\par Discussed at length about the procedure. Discussed option of pursuing bone marrow vs observation\par He opts for observation\par \par Labs today show persistent leucopenia\par He did have COVID infection, but recovered completely\par \par Iron profile suggestive of iron overload - hemochromatosis gene study negative\par Consider MRI liver to assess iron stores\par LFTs normal\par \par Positive JÚNIOR- low titers\par \par Follow up in 6 months. CBC, CMP, iron, TIBC, ferritin, LDH, GGT

## 2020-07-27 NOTE — HISTORY OF PRESENT ILLNESS
[de-identified] : Mr Pabon is a very pleasant 63 year old gentleman with PMHx of BPH seen today to evaluated  for leucopenia\par \par He follows with Dr Kellogg for almost 10 years and has physical and blood work at least annually\par Was never told of low WBC\par \par Has seen Dr Reynolds (Hematology, [place]) long time back - unsure why.\par Was monitored for a while and they he stopped following up\par \par No fevers chills night sweats\par No fatigue, tiredness, apathy\par No appetite loss or weight loss/weight gain\par No chest pain, shortness of breath, palpitation, dizziness\par No abdominal pain, nausea, vomiting, diarrhea, constipation\par No bright red blood per rectum, hematemesis or melena\par No hematuria, dysuria, frequency, urgency\par No headaches, visual changes, focal weakness, tingling, numbness\par \par No other OTC or herbal medications\par \par He took intra-articular steroids for right rotator cuff tear at [place] ([place])\par \par WBC 3.3 -> 4.4 ->3.5 -> 3.8\par LDH 1267 -> 400s-> normal\par ferritin 351.9\par % sat 48\par \par Ultrasound\par Normal caliber spleen and grossly unremarkable appearance of the liver.\par \par He saw Dr Shaw (rheumatology)-> "leucopenia might be related to JÚNIOR+, but no active CTD. Also had borderline RNP+\par  [de-identified] : HE is seen today for follow up\par \par In 2020, he had COVID infection, He had extreme fatigue, loss of appetite, fever. He completely recovered in 4 weeks\par His elder brother  from COVID \par  \par

## 2020-09-18 ENCOUNTER — RX RENEWAL (OUTPATIENT)
Age: 65
End: 2020-09-18

## 2021-02-09 NOTE — REVIEW OF SYSTEMS
[FreeTextEntry2] : 10 point review of systems negative except as outlined in HPI Universal Safety Interventions

## 2021-06-10 ENCOUNTER — APPOINTMENT (OUTPATIENT)
Dept: UROLOGY | Facility: CLINIC | Age: 66
End: 2021-06-10
Payer: MEDICARE

## 2021-06-10 VITALS
DIASTOLIC BLOOD PRESSURE: 71 MMHG | HEART RATE: 76 BPM | SYSTOLIC BLOOD PRESSURE: 132 MMHG | HEIGHT: 75.98 IN | TEMPERATURE: 97.6 F | BODY MASS INDEX: 29.83 KG/M2 | WEIGHT: 245 LBS

## 2021-06-10 PROCEDURE — 36415 COLL VENOUS BLD VENIPUNCTURE: CPT

## 2021-06-10 PROCEDURE — 99213 OFFICE O/P EST LOW 20 MIN: CPT

## 2021-06-10 RX ORDER — ECONAZOLE NITRATE 10 MG/G
1 CREAM TOPICAL
Qty: 30 | Refills: 0 | Status: DISCONTINUED | COMMUNITY
Start: 2019-04-19 | End: 2021-06-10

## 2021-06-10 NOTE — ASSESSMENT
[FreeTextEntry1] : This is a routine followup this 66-year-old patient has a family history of prostate cancer\par Both his father and older brother had prostate cancer\par His PSA and rectal exam had been fine today repeated both\par There is no rectal abnormality to suggest underlying malignancy\par the patient has symptoms of prostate enlargement as well as erectile dysfunction \par He is done well with Cialis 20 mg or erectile dysfunction\par He has been well maintained on Cialis 5 mg for symptoms of bladder Outlet obstruction

## 2021-06-10 NOTE — PHYSICAL EXAM
[General Appearance - Well Developed] : well developed [General Appearance - Well Nourished] : well nourished [Normal Appearance] : normal appearance [Well Groomed] : well groomed [General Appearance - In No Acute Distress] : no acute distress [Abdomen Soft] : soft [Abdomen Tenderness] : non-tender [Costovertebral Angle Tenderness] : no ~M costovertebral angle tenderness [Urethral Meatus] : meatus normal [Urinary Bladder Findings] : the bladder was normal on palpation [Scrotum] : the scrotum was normal [Testes Mass (___cm)] : there were no testicular masses [No Prostate Nodules] : no prostate nodules [FreeTextEntry1] : Prostate is not suspicious on digital rectal examination is as no firmness or nodularity on today's exam [Edema] : no peripheral edema [] : no respiratory distress [Respiration, Rhythm And Depth] : normal respiratory rhythm and effort [Exaggerated Use Of Accessory Muscles For Inspiration] : no accessory muscle use [Oriented To Time, Place, And Person] : oriented to person, place, and time [Affect] : the affect was normal [Mood] : the mood was normal [Not Anxious] : not anxious [Normal Station and Gait] : the gait and station were normal for the patient's age [No Focal Deficits] : no focal deficits [No Palpable Adenopathy] : no palpable adenopathy

## 2021-06-14 LAB
ALBUMIN SERPL ELPH-MCNC: 4.4 G/DL
ALP BLD-CCNC: 80 U/L
ALT SERPL-CCNC: 19 U/L
ANION GAP SERPL CALC-SCNC: 13 MMOL/L
AST SERPL-CCNC: 20 U/L
BILIRUB SERPL-MCNC: 0.4 MG/DL
BUN SERPL-MCNC: 14 MG/DL
CALCIUM SERPL-MCNC: 9.6 MG/DL
CHLORIDE SERPL-SCNC: 106 MMOL/L
CO2 SERPL-SCNC: 21 MMOL/L
CREAT SERPL-MCNC: 1 MG/DL
GLUCOSE SERPL-MCNC: 92 MG/DL
POTASSIUM SERPL-SCNC: 4.3 MMOL/L
PROT SERPL-MCNC: 6.8 G/DL
PSA SERPL-MCNC: 2.52 NG/ML
SODIUM SERPL-SCNC: 140 MMOL/L

## 2021-07-23 ENCOUNTER — APPOINTMENT (OUTPATIENT)
Dept: HEMATOLOGY ONCOLOGY | Facility: CLINIC | Age: 66
End: 2021-07-23
Payer: MEDICARE

## 2021-07-23 ENCOUNTER — RESULT REVIEW (OUTPATIENT)
Age: 66
End: 2021-07-23

## 2021-07-23 VITALS
BODY MASS INDEX: 29.94 KG/M2 | HEIGHT: 75.98 IN | WEIGHT: 245.9 LBS | TEMPERATURE: 95.9 F | SYSTOLIC BLOOD PRESSURE: 107 MMHG | HEART RATE: 79 BPM | OXYGEN SATURATION: 97 % | RESPIRATION RATE: 16 BRPM | DIASTOLIC BLOOD PRESSURE: 57 MMHG

## 2021-07-23 DIAGNOSIS — R74.01 ELEVATION OF LEVELS OF LIVER TRANSAMINASE LEVELS: ICD-10-CM

## 2021-07-23 PROCEDURE — 99214 OFFICE O/P EST MOD 30 MIN: CPT

## 2021-07-23 RX ORDER — TADALAFIL 5 MG/1
5 TABLET ORAL
Qty: 90 | Refills: 3 | Status: DISCONTINUED | COMMUNITY
Start: 2020-02-04 | End: 2021-07-23

## 2021-07-23 RX ORDER — TADALAFIL 5 MG/1
5 TABLET ORAL
Qty: 90 | Refills: 3 | Status: DISCONTINUED | COMMUNITY
Start: 2021-03-30 | End: 2021-07-23

## 2021-07-23 RX ORDER — TADALAFIL 20 MG/1
20 TABLET ORAL
Qty: 24 | Refills: 6 | Status: DISCONTINUED | COMMUNITY
Start: 2021-06-10 | End: 2021-07-23

## 2021-07-23 NOTE — HISTORY OF PRESENT ILLNESS
[de-identified] : Mr Pabon is a very pleasant 63 year old gentleman with PMHx of BPH seen today to evaluated  for leucopenia\par \par He follows with Dr Kellogg for almost 10 years and has physical and blood work at least annually\par Was never told of low WBC\par \par Has seen Dr Reynolds (Hematology, [place]) long time back - unsure why.\par Was monitored for a while and they he stopped following up\par \par No fevers chills night sweats\par No fatigue, tiredness, apathy\par No appetite loss or weight loss/weight gain\par No chest pain, shortness of breath, palpitation, dizziness\par No abdominal pain, nausea, vomiting, diarrhea, constipation\par No bright red blood per rectum, hematemesis or melena\par No hematuria, dysuria, frequency, urgency\par No headaches, visual changes, focal weakness, tingling, numbness\par \par No other OTC or herbal medications\par \par He took intra-articular steroids for right rotator cuff tear at [place] ([place])\par \par WBC 3.3 -> 4.4 ->3.5 -> 3.8\par LDH 1267 -> 400s-> normal\par ferritin 351.9\par % sat 48\par \par Ultrasound\par Normal caliber spleen and grossly unremarkable appearance of the liver.\par \par He saw Dr Shaw (rheumatology)-> "leucopenia might be related to JÚNIOR+, but no active CTD. Also had borderline RNP+\par  [de-identified] : HE is seen today for follow up, reports of doing well. \par He had his 2nd Moderna vaccine in March 2021. \par

## 2021-07-23 NOTE — RESULTS/DATA
[FreeTextEntry1] : Labs reviewed and discussed\par 7/23/21 wbc 5.2 Hgb 14.2 hct 42 plts 215\par Alk margarito 166 ast/alt - 90/110\par GGTP - 204 \par

## 2021-07-23 NOTE — CONSULT LETTER
[FreeTextEntry3] : Santiago Washington MD, MPH\par Attending Physician\par Hematology Oncology\par [facility] [place]\par [facility]\par

## 2021-07-23 NOTE — ASSESSMENT
[FreeTextEntry1] : Leucopenia\par Mostly lymphopenia, occasional neutropenia\par JÚNIOR positive - low titers\par \par Clinically he feels good\par Fluctuating WBC - now normalized \par LDH was elevated in the initial labs, normalized on repeat\par Ultrasound abdomen rules out hepatosplenomegaly\par The next step in the work up is a bone marrow - however may not be high yield given the chronicity and asymptomatic nature of his findings\par WBCs now normalized - to closely monitor for now.\par \par #Covid vaccine in May 2021 \par \par Labs today show persistent leucopenia\par He did have COVID infection, but recovered completely\par \par Iron profile suggestive of iron overload - hemochromatosis gene study negative\par LFTs and GGTP  elevated\par to have MRI of abdomen. \par \par Positive JÚNIOR- low titers\par \par Follow up in 3 months. CBC, CMP, iron, TIBC, ferritin, LDH, GGT

## 2021-08-19 ENCOUNTER — RESULT REVIEW (OUTPATIENT)
Age: 66
End: 2021-08-19

## 2022-07-29 ENCOUNTER — APPOINTMENT (OUTPATIENT)
Dept: HEMATOLOGY ONCOLOGY | Facility: CLINIC | Age: 67
End: 2022-07-29

## 2022-07-29 ENCOUNTER — RESULT REVIEW (OUTPATIENT)
Age: 67
End: 2022-07-29

## 2022-07-29 VITALS
BODY MASS INDEX: 30.5 KG/M2 | HEART RATE: 58 BPM | RESPIRATION RATE: 16 BRPM | HEIGHT: 75.98 IN | WEIGHT: 250.5 LBS | SYSTOLIC BLOOD PRESSURE: 117 MMHG | OXYGEN SATURATION: 99 % | TEMPERATURE: 96.9 F | DIASTOLIC BLOOD PRESSURE: 70 MMHG

## 2022-07-29 DIAGNOSIS — E83.19 OTHER DISORDERS OF IRON METABOLISM: ICD-10-CM

## 2022-07-29 PROCEDURE — 99213 OFFICE O/P EST LOW 20 MIN: CPT | Mod: 25

## 2022-07-29 PROCEDURE — 36415 COLL VENOUS BLD VENIPUNCTURE: CPT

## 2022-07-29 RX ORDER — RIVAROXABAN 20 MG/1
20 TABLET, FILM COATED ORAL
Qty: 90 | Refills: 0 | Status: ACTIVE | COMMUNITY
Start: 2021-09-10

## 2022-07-29 NOTE — ASSESSMENT
[FreeTextEntry1] : ## Leucopenia\par Mostly lymphopenia, occasional neutropenia\par JÚNIOR positive - low titers\par 12/2018 Abd US  rules out hepatosplenomegaly\par LDH was elevated in the initial labs, normalized on repeat\par \par Patient is here for follow\par Other than undergoing cardiac work up with stress test and seeing cardiology for Atrial Fib, he's doing well. \par -no frequent infection except for positive Covid infection in April 2022 with minimal symptoms.  \par -Labs are drawn in the office, reviewed, analyzed, and discussed\par -WBC remains low with ANC 1.49\par Explained to patient hat the next work up is bone marrow and he wants to hold off for now since his WBCs been fluctuating. He has follow up with his PCP in Oct and will send us the result then. Will consider bone marrow prn. \par \par # Iron profile suggestive of iron overload \par - hemochromatosis gene study negative\par LFTs and GGTP  elevated\par 3/2021 MRI - no iron deposit. Slight hepatic steatosis. \par \par d/w Dr. Washington \par Follow up in 1 yea  CBC, CMP, iron, TIBC, ferritin, LDH, GGT

## 2022-07-29 NOTE — CONSULT LETTER
[Dear  ___] : Dear  [unfilled], [Courtesy Letter:] : I had the pleasure of seeing your patient, [unfilled], in my office today. [Please see my note below.] : Please see my note below. [Consult Closing:] : Thank you very much for allowing me to participate in the care of this patient.  If you have any questions, please do not hesitate to contact me. [Sincerely,] : Sincerely, [FreeTextEntry3] : Santiago Washington MD, MPH\par Attending Physician\par Hematology Oncology\par [facility] [place]\par [facility]\par

## 2022-07-29 NOTE — HISTORY OF PRESENT ILLNESS
[de-identified] : Mr Pabon is a very pleasant 63 year old gentleman with PMHx of BPH seen today to evaluated  for leucopenia\par \par He follows with Dr Kellogg for almost 10 years and has physical and blood work at least annually\par Was never told of low WBC\par \par Has seen Dr Reynolds (Hematology, [place]) long time back - unsure why.\par Was monitored for a while and they he stopped following up\par \par No fevers chills night sweats\par No fatigue, tiredness, apathy\par No appetite loss or weight loss/weight gain\par No chest pain, shortness of breath, palpitation, dizziness\par No abdominal pain, nausea, vomiting, diarrhea, constipation\par No bright red blood per rectum, hematemesis or melena\par No hematuria, dysuria, frequency, urgency\par No headaches, visual changes, focal weakness, tingling, numbness\par \par No other OTC or herbal medications\par \par He took intra-articular steroids for right rotator cuff tear at [place] ([place])\par \par WBC 3.3 -> 4.4 ->3.5 -> 3.8\par LDH 1267 -> 400s-> normal\par ferritin 351.9\par % sat 48\par \par Ultrasound\par Normal caliber spleen and grossly unremarkable appearance of the liver.\par \par He saw Dr Shaw (rheumatology)-> "leucopenia might be related to JÚNIOR+, but no active CTD. Also had borderline RNP+\par  [de-identified] : HE is seen today for follow up\par \par Since his last follow up, he had lyme disease and then diagnosed with A. Fib now on Xarelto. He just had a stress test earlier this morning in [place], seeing cardiologist Dr. Gamino at [facility]. \par He reports of having intermittent SOB and fatigue since, currently asymptomatic.

## 2022-09-21 ENCOUNTER — APPOINTMENT (OUTPATIENT)
Dept: UROLOGY | Facility: CLINIC | Age: 67
End: 2022-09-21

## 2022-09-21 VITALS — SYSTOLIC BLOOD PRESSURE: 126 MMHG | HEART RATE: 83 BPM | DIASTOLIC BLOOD PRESSURE: 84 MMHG

## 2022-09-21 DIAGNOSIS — I20.8 OTHER FORMS OF ANGINA PECTORIS: ICD-10-CM

## 2022-09-21 DIAGNOSIS — Z80.42 FAMILY HISTORY OF MALIGNANT NEOPLASM OF PROSTATE: ICD-10-CM

## 2022-09-21 DIAGNOSIS — N52.9 MALE ERECTILE DYSFUNCTION, UNSPECIFIED: ICD-10-CM

## 2022-09-21 PROCEDURE — 99214 OFFICE O/P EST MOD 30 MIN: CPT

## 2022-09-21 PROCEDURE — 36415 COLL VENOUS BLD VENIPUNCTURE: CPT

## 2022-09-21 RX ORDER — RANOLAZINE 500 MG/1
500 TABLET, EXTENDED RELEASE ORAL
Refills: 0 | Status: ACTIVE | COMMUNITY

## 2022-09-21 RX ORDER — TADALAFIL 20 MG/1
20 TABLET ORAL
Qty: 6 | Refills: 11 | Status: COMPLETED | COMMUNITY
Start: 2019-07-24 | End: 2022-09-21

## 2022-09-21 NOTE — ASSESSMENT
[FreeTextEntry1] : This a routine yearly followup for this 67-year-old patient with symptoms of BPH responsive to see us 5 mg as been taking Cialis 20 mg correct no dysfunction with good success\par Visit family history of prostate cancer but his PSA has been very stable at 2.5\par His distal rectal exam is perfectly benign feeling\par I renewed his medication\par

## 2022-09-21 NOTE — PHYSICAL EXAM
[General Appearance - Well Developed] : well developed [General Appearance - Well Nourished] : well nourished [Normal Appearance] : normal appearance [Well Groomed] : well groomed [General Appearance - In No Acute Distress] : no acute distress [Abdomen Soft] : soft [Abdomen Tenderness] : non-tender [Costovertebral Angle Tenderness] : no ~M costovertebral angle tenderness [Urethral Meatus] : meatus normal [Urinary Bladder Findings] : the bladder was normal on palpation [Scrotum] : the scrotum was normal [Testes Mass (___cm)] : there were no testicular masses [No Prostate Nodules] : no prostate nodules [FreeTextEntry1] : Surgeon is smooth symmetrical and non-concerning on NBA for prostate cancer [Edema] : no peripheral edema [] : no respiratory distress [Respiration, Rhythm And Depth] : normal respiratory rhythm and effort [Exaggerated Use Of Accessory Muscles For Inspiration] : no accessory muscle use [Oriented To Time, Place, And Person] : oriented to person, place, and time [Affect] : the affect was normal [Mood] : the mood was normal [Not Anxious] : not anxious [Normal Station and Gait] : the gait and station were normal for the patient's age [No Focal Deficits] : no focal deficits [No Palpable Adenopathy] : no palpable adenopathy

## 2022-09-22 LAB
ALBUMIN SERPL ELPH-MCNC: 4.5 G/DL
ALP BLD-CCNC: 71 U/L
ALT SERPL-CCNC: 15 U/L
ANION GAP SERPL CALC-SCNC: 12 MMOL/L
AST SERPL-CCNC: 17 U/L
BILIRUB SERPL-MCNC: 0.8 MG/DL
BUN SERPL-MCNC: 12 MG/DL
CALCIUM SERPL-MCNC: 9.7 MG/DL
CHLORIDE SERPL-SCNC: 105 MMOL/L
CO2 SERPL-SCNC: 26 MMOL/L
CREAT SERPL-MCNC: 1.01 MG/DL
EGFR: 81 ML/MIN/1.73M2
GLUCOSE SERPL-MCNC: 77 MG/DL
POTASSIUM SERPL-SCNC: 4.3 MMOL/L
PROT SERPL-MCNC: 6.9 G/DL
PSA SERPL-MCNC: 2.74 NG/ML
SODIUM SERPL-SCNC: 143 MMOL/L

## 2023-07-28 ENCOUNTER — RESULT REVIEW (OUTPATIENT)
Age: 68
End: 2023-07-28

## 2023-07-28 ENCOUNTER — APPOINTMENT (OUTPATIENT)
Dept: HEMATOLOGY ONCOLOGY | Facility: CLINIC | Age: 68
End: 2023-07-28
Payer: MEDICARE

## 2023-07-28 VITALS
WEIGHT: 241 LBS | TEMPERATURE: 96.6 F | SYSTOLIC BLOOD PRESSURE: 110 MMHG | HEART RATE: 68 BPM | RESPIRATION RATE: 16 BRPM | OXYGEN SATURATION: 99 % | HEIGHT: 75.98 IN | DIASTOLIC BLOOD PRESSURE: 62 MMHG | BODY MASS INDEX: 29.35 KG/M2

## 2023-07-28 DIAGNOSIS — D72.819 DECREASED WHITE BLOOD CELL COUNT, UNSPECIFIED: ICD-10-CM

## 2023-07-28 PROCEDURE — 36415 COLL VENOUS BLD VENIPUNCTURE: CPT

## 2023-07-28 PROCEDURE — 99213 OFFICE O/P EST LOW 20 MIN: CPT | Mod: 25

## 2023-07-28 NOTE — ASSESSMENT
[FreeTextEntry1] : ## Leucopenia\par Mostly lymphopenia, occasional neutropenia\par JÚNIOR positive - low titers\par 12/2018 Abd US  rules out hepatosplenomegaly\par LDH was elevated in the initial labs, normalized on repeat\par \par Patient is here for follow\par -no frequent infections in the last year \par -Labs are drawn in the office, reviewed, analyzed, and discussed\par Had labs in 10/2022 with his PCP Valdez Spaulding  noted for normal WBC, ANC, Hgb but .9  \par -today labs WBC remains slightly low with normal ANC and now with new macrocytic anemia - we'll have him do labs with his PCP in Nov 2023 and follow up with us after. \par -We'll do bone marrow prn and patient is opting for close monitoring in the mean time. \par \par # Iron profile suggestive of iron overload \par - hemochromatosis gene study negative\par hx elevated LFTs and GGTP  (during  lyme infection) - now normalized \par 3/2021 MRI - no iron deposit. Slight hepatic steatosis. \par \par d/w Dr. Washington \par Follow up in 1 yea  CBC, CMP, iron, TIBC, ferritin, LDH, GGT, folate/MMA/b12

## 2023-07-28 NOTE — HISTORY OF PRESENT ILLNESS
[de-identified] : Mr Pabon is a very pleasant 63 year old gentleman with PMHx of BPH seen today to evaluated  for leucopenia\par \par He follows with Dr Kellogg for almost 10 years and has physical and blood work at least annually\par Was never told of low WBC\par \par Has seen Dr Reynolds (Hematology, [place]) long time back - unsure why.\par Was monitored for a while and they he stopped following up\par \par No fevers chills night sweats\par No fatigue, tiredness, apathy\par No appetite loss or weight loss/weight gain\par No chest pain, shortness of breath, palpitation, dizziness\par No abdominal pain, nausea, vomiting, diarrhea, constipation\par No bright red blood per rectum, hematemesis or melena\par No hematuria, dysuria, frequency, urgency\par No headaches, visual changes, focal weakness, tingling, numbness\par \par No other OTC or herbal medications\par \par He took intra-articular steroids for right rotator cuff tear at [place] ([place])\par \par WBC 3.3 -> 4.4 ->3.5 -> 3.8\par LDH 1267 -> 400s-> normal\par ferritin 351.9\par % sat 48\par \par Ultrasound\par Normal caliber spleen and grossly unremarkable appearance of the liver.\par \par He saw Dr Shaw (rheumatology)-> "leucopenia might be related to JÚNIOR+, but no active CTD. Also had borderline RNP+\par  [de-identified] : HE is seen today for follow up\par \par His energy improved with sleeping better with better fitted CPAP. \par Denies frequent infections or hospitalizations. \par

## 2023-07-28 NOTE — CONSULT LETTER
[Dear  ___] : Dear  [unfilled], [Courtesy Letter:] : I had the pleasure of seeing your patient, [unfilled], in my office today. [Please see my note below.] : Please see my note below. [Consult Closing:] : Thank you very much for allowing me to participate in the care of this patient.  If you have any questions, please do not hesitate to contact me. [Sincerely,] : Sincerely, [FreeTextEntry3] : Santiago Washington MD, MPH\par Attending Physician\par Hematology Oncology\par [facility] [place]\par [facility]\par

## 2023-09-21 ENCOUNTER — APPOINTMENT (OUTPATIENT)
Dept: UROLOGY | Facility: CLINIC | Age: 68
End: 2023-09-21
Payer: MEDICARE

## 2023-09-21 VITALS
BODY MASS INDEX: 26.68 KG/M2 | DIASTOLIC BLOOD PRESSURE: 73 MMHG | WEIGHT: 226 LBS | HEART RATE: 90 BPM | SYSTOLIC BLOOD PRESSURE: 133 MMHG | HEIGHT: 77 IN

## 2023-09-21 PROCEDURE — 99214 OFFICE O/P EST MOD 30 MIN: CPT

## 2023-09-21 RX ORDER — TADALAFIL 20 MG/1
20 TABLET ORAL
Qty: 10 | Refills: 6 | Status: ACTIVE | COMMUNITY
Start: 2022-09-21 | End: 1900-01-01

## 2023-09-21 RX ORDER — TADALAFIL 5 MG/1
5 TABLET ORAL
Qty: 90 | Refills: 3 | Status: COMPLETED | COMMUNITY
Start: 2021-06-10 | End: 2023-09-21

## 2023-09-26 RX ORDER — TADALAFIL 5 MG/1
5 TABLET ORAL
Qty: 90 | Refills: 3 | Status: ACTIVE | COMMUNITY
Start: 2022-09-21 | End: 1900-01-01

## 2023-09-27 LAB — PSA SERPL-MCNC: 7.05 NG/ML

## 2023-11-02 ENCOUNTER — APPOINTMENT (OUTPATIENT)
Dept: UROLOGY | Facility: CLINIC | Age: 68
End: 2023-11-02
Payer: MEDICARE

## 2023-11-02 VITALS
DIASTOLIC BLOOD PRESSURE: 70 MMHG | HEART RATE: 70 BPM | SYSTOLIC BLOOD PRESSURE: 121 MMHG | BODY MASS INDEX: 26.8 KG/M2 | WEIGHT: 227 LBS | HEIGHT: 77 IN

## 2023-11-02 PROCEDURE — 99213 OFFICE O/P EST LOW 20 MIN: CPT

## 2024-05-09 ENCOUNTER — APPOINTMENT (OUTPATIENT)
Dept: UROLOGY | Facility: CLINIC | Age: 69
End: 2024-05-09
Payer: MEDICARE

## 2024-05-09 VITALS
HEIGHT: 77 IN | HEART RATE: 65 BPM | SYSTOLIC BLOOD PRESSURE: 101 MMHG | DIASTOLIC BLOOD PRESSURE: 61 MMHG | BODY MASS INDEX: 26.8 KG/M2 | WEIGHT: 227 LBS

## 2024-05-09 DIAGNOSIS — N40.1 BENIGN PROSTATIC HYPERPLASIA WITH LOWER URINARY TRACT SYMPMS: ICD-10-CM

## 2024-05-09 DIAGNOSIS — R97.20 ELEVATED PROSTATE, SPECIFIC ANTIGEN [PSA]: ICD-10-CM

## 2024-05-09 DIAGNOSIS — N52.01 ERECTILE DYSFUNCTION DUE TO ARTERIAL INSUFFICIENCY: ICD-10-CM

## 2024-05-09 PROCEDURE — 99213 OFFICE O/P EST LOW 20 MIN: CPT

## 2024-05-10 LAB — PSA SERPL-MCNC: 3.1 NG/ML

## 2024-05-30 PROBLEM — R97.20 RISING PSA LEVEL: Status: ACTIVE | Noted: 2023-11-02

## 2024-05-30 PROBLEM — N52.01 ERECTILE DYSFUNCTION DUE TO ARTERIAL INSUFFICIENCY: Status: ACTIVE | Noted: 2019-07-24

## 2024-05-30 NOTE — ASSESSMENT
[FreeTextEntry1] : Patient is a 69-year-old man with BPH, ED and prostate cancer screening with strong family history of prostate cancer. His PSAs have been stable in the 2 range. His PCP recently rechecked it last week and is here to go over the results. He has had no interval hematuria, dysuria or pelvic pain.  Assessment and plan 1. Rising/elevated PSA 2.  BPH  3.  ED NBA was normal today and PSA was drawn.  His ED and BPH symptoms are stable.  He will follow-up in 6 months.

## 2024-07-26 ENCOUNTER — RESULT REVIEW (OUTPATIENT)
Age: 69
End: 2024-07-26

## 2024-07-26 ENCOUNTER — APPOINTMENT (OUTPATIENT)
Dept: HEMATOLOGY ONCOLOGY | Facility: CLINIC | Age: 69
End: 2024-07-26
Payer: MEDICARE

## 2024-07-26 VITALS
HEIGHT: 77 IN | HEART RATE: 58 BPM | TEMPERATURE: 97.2 F | WEIGHT: 236.44 LBS | BODY MASS INDEX: 27.92 KG/M2 | SYSTOLIC BLOOD PRESSURE: 108 MMHG | OXYGEN SATURATION: 98 % | DIASTOLIC BLOOD PRESSURE: 58 MMHG | RESPIRATION RATE: 16 BRPM

## 2024-07-26 DIAGNOSIS — D72.819 DECREASED WHITE BLOOD CELL COUNT, UNSPECIFIED: ICD-10-CM

## 2024-07-26 PROCEDURE — 99213 OFFICE O/P EST LOW 20 MIN: CPT

## 2024-07-26 PROCEDURE — 36415 COLL VENOUS BLD VENIPUNCTURE: CPT

## 2024-07-26 NOTE — HISTORY OF PRESENT ILLNESS
[de-identified] : Mr Pabon is a very pleasant 63 year old gentleman with PMHx of BPH seen today to evaluated  for leucopenia\par  \par  He follows with Dr Kellogg for almost 10 years and has physical and blood work at least annually\par  Was never told of low WBC\par  \par  Has seen Dr Reynolds (Hematology, [place]) long time back - unsure why.\par  Was monitored for a while and they he stopped following up\par  \par  No fevers chills night sweats\par  No fatigue, tiredness, apathy\par  No appetite loss or weight loss/weight gain\par  No chest pain, shortness of breath, palpitation, dizziness\par  No abdominal pain, nausea, vomiting, diarrhea, constipation\par  No bright red blood per rectum, hematemesis or melena\par  No hematuria, dysuria, frequency, urgency\par  No headaches, visual changes, focal weakness, tingling, numbness\par  \par  No other OTC or herbal medications\par  \par  He took intra-articular steroids for right rotator cuff tear at [place] ([place])\par  \par  WBC 3.3 -> 4.4 ->3.5 -> 3.8\par  LDH 1267 -> 400s-> normal\par  ferritin 351.9\par  % sat 48\par  \par  Ultrasound\par  Normal caliber spleen and grossly unremarkable appearance of the liver.\par  \par  He saw Dr Shaw (rheumatology)-> "leucopenia might be related to JÚNIOR+, but no active CTD. Also had borderline RNP+\par   [de-identified] : HE is seen today for follow up  His energy improved with sleeping better with better fitted CPAP.  Denies frequent infections or hospitalizations.  The patient mentions having a recent hospitalization for a rotator cuff tear.

## 2024-07-26 NOTE — HISTORY OF PRESENT ILLNESS
Nursing Transfer Note      1/9/2018     Transfer To: [address]    Transfer via stretcher    Transfer with na    Transported by pct    Medicines sent: na    Chart send with patient: Yes    Notified: father    Patient reassessed at: 1309 1/9/18 (date, time)    Upon arrival to floor: bed in lowest position    Report called to Vaishali floor nurse at 1230. Will call to update   [de-identified] : Mr Pabon is a very pleasant 63 year old gentleman with PMHx of BPH seen today to evaluated  for leucopenia\par  \par  He follows with Dr Kellogg for almost 10 years and has physical and blood work at least annually\par  Was never told of low WBC\par  \par  Has seen Dr Reynolds (Hematology, [place]) long time back - unsure why.\par  Was monitored for a while and they he stopped following up\par  \par  No fevers chills night sweats\par  No fatigue, tiredness, apathy\par  No appetite loss or weight loss/weight gain\par  No chest pain, shortness of breath, palpitation, dizziness\par  No abdominal pain, nausea, vomiting, diarrhea, constipation\par  No bright red blood per rectum, hematemesis or melena\par  No hematuria, dysuria, frequency, urgency\par  No headaches, visual changes, focal weakness, tingling, numbness\par  \par  No other OTC or herbal medications\par  \par  He took intra-articular steroids for right rotator cuff tear at [place] ([place]par  \par  WBC 3.3 -> 4.4 ->3.5 -> 3.8\par  LDH 1267 -> 400s-> normal\par  ferritin 351.9\par  % sat 48\par  \par  Ultrasound\par  Normal caliber spleen and grossly unremarkable appearance of the liver.\par  \par  He saw Dr Shaw (rheumatology)-> "leucopenia might be related to JÚNIOR+, but no active CTD. Also had borderline RNP+\par   [de-identified] : HE is seen today for follow up  His energy improved with sleeping better with better fitted CPAP.  Denies frequent infections or hospitalizations.  The patient mentions having a recent hospitalization for a rotator cuff tear.

## 2024-07-26 NOTE — ASSESSMENT
[FreeTextEntry1] : ## Leucopenia Mostly lymphopenia, occasional neutropenia JÚNIOR positive - low titers 12/2018 Abd US  rules out hepatosplenomegaly LDH was elevated in the initial labs, normalized on repeat  Patient is here for follow -no frequent infections in the last year  -Labs are drawn in the office, reviewed, analyzed, and discussed Chronic leukopenia, mostly lymphopenia Clinically uneventful Given the chronicity, probability of clonal process is low  # Iron profile suggestive of iron overload  - hemochromatosis gene study negative hx elevated LFTs and GGTP  (during  lyme infection) - now normalized  3/2021 MRI - no iron deposit. Slight hepatic steatosis.   Advised patient to monitor with the care team including PCP. Parameters including symptoms, signs and labs to monitor discussed at length. Advised to follow up PRN, Contact information given. Patient agrees with complete understanding.

## 2024-07-26 NOTE — CONSULT LETTER
[Dear  ___] : Dear  [unfilled], [Courtesy Letter:] : I had the pleasure of seeing your patient, [unfilled], in my office today. [Please see my note below.] : Please see my note below. [Consult Closing:] : Thank you very much for allowing me to participate in the care of this patient.  If you have any questions, please do not hesitate to contact me. [Sincerely,] : Sincerely, [FreeTextEntry3] : Santiago Washington MD, MPH\par  Attending Physician\par  Hematology Oncology\par  [facility] [place]\par  [facility]\par

## 2024-07-26 NOTE — CONSULT LETTER
[Dear  ___] : Dear  [unfilled], [Courtesy Letter:] : I had the pleasure of seeing your patient, [unfilled], in my office today. [Please see my note below.] : Please see my note below. [Consult Closing:] : Thank you very much for allowing me to participate in the care of this patient.  If you have any questions, please do not hesitate to contact me. [Sincerely,] : Sincerely, [FreeTextEntry3] : Santiago Washington MD, MPH\par  Attending Physician\par  Hematology Oncology\par  [facility] [place]\par  [facility]\par

## 2024-09-23 ENCOUNTER — APPOINTMENT (OUTPATIENT)
Dept: UROLOGY | Facility: CLINIC | Age: 69
End: 2024-09-23
Payer: MEDICARE

## 2024-09-23 VITALS
BODY MASS INDEX: 27.28 KG/M2 | HEART RATE: 77 BPM | DIASTOLIC BLOOD PRESSURE: 71 MMHG | WEIGHT: 231 LBS | HEIGHT: 77 IN | SYSTOLIC BLOOD PRESSURE: 118 MMHG

## 2024-09-23 DIAGNOSIS — R97.20 ELEVATED PROSTATE, SPECIFIC ANTIGEN [PSA]: ICD-10-CM

## 2024-09-23 DIAGNOSIS — N52.9 MALE ERECTILE DYSFUNCTION, UNSPECIFIED: ICD-10-CM

## 2024-09-23 DIAGNOSIS — N40.1 BENIGN PROSTATIC HYPERPLASIA WITH LOWER URINARY TRACT SYMPMS: ICD-10-CM

## 2024-09-23 PROCEDURE — 99214 OFFICE O/P EST MOD 30 MIN: CPT

## 2024-09-23 NOTE — ASSESSMENT
[FreeTextEntry1] : Patient is a 69-year-old man with BPH, ED and with strong family history of prostate cancer. His PSAs have been stable in the 2 range.  He is here today to discuss options regarding his erectile dysfunction.  He is on tadalafil 5 mg p.o. daily for both BPH and ED.  Assessment and plan 1. BPH 2. ED 3.  Family history of prostate cancer Continue tadalafil 5 mg p.o. daily.  We will have him look into the vacuum erection device to improve the quality of his erections.  His PSA was drawn today and I will call him with that result.

## 2024-09-27 LAB — PSA SERPL-MCNC: 2.44 NG/ML

## 2024-10-31 ENCOUNTER — APPOINTMENT (OUTPATIENT)
Dept: UROLOGY | Facility: CLINIC | Age: 69
End: 2024-10-31